# Patient Record
Sex: FEMALE | Race: WHITE | URBAN - METROPOLITAN AREA
[De-identification: names, ages, dates, MRNs, and addresses within clinical notes are randomized per-mention and may not be internally consistent; named-entity substitution may affect disease eponyms.]

---

## 2019-03-05 ENCOUNTER — IMPORTED ENCOUNTER (OUTPATIENT)
Dept: URBAN - METROPOLITAN AREA CLINIC 50 | Facility: CLINIC | Age: 74
End: 2019-03-05

## 2019-03-11 ENCOUNTER — IMPORTED ENCOUNTER (OUTPATIENT)
Dept: URBAN - METROPOLITAN AREA CLINIC 50 | Facility: CLINIC | Age: 74
End: 2019-03-11

## 2019-03-22 ENCOUNTER — IMPORTED ENCOUNTER (OUTPATIENT)
Dept: URBAN - METROPOLITAN AREA CLINIC 50 | Facility: CLINIC | Age: 74
End: 2019-03-22

## 2019-04-02 ENCOUNTER — IMPORTED ENCOUNTER (OUTPATIENT)
Dept: URBAN - METROPOLITAN AREA CLINIC 50 | Facility: CLINIC | Age: 74
End: 2019-04-02

## 2019-04-09 ENCOUNTER — IMPORTED ENCOUNTER (OUTPATIENT)
Dept: URBAN - METROPOLITAN AREA CLINIC 50 | Facility: CLINIC | Age: 74
End: 2019-04-09

## 2019-05-08 ENCOUNTER — IMPORTED ENCOUNTER (OUTPATIENT)
Dept: URBAN - METROPOLITAN AREA CLINIC 50 | Facility: CLINIC | Age: 74
End: 2019-05-08

## 2021-04-17 ASSESSMENT — VISUAL ACUITY
OD_CC: 20/30
OD_CC: 20/40-1/+1
OD_CC: 20/30
OS_CC: 20/30
OS_CC: 20/30-2
OS_CC: 20/30
OD_CC: J2@ 16 IN
OD_CC: 20/30
OS_OTHER: 20/200. 20/400.
OD_PH: 20/30
OD_OTHER: 20/80. 20/400.
OS_CC: 20/30
OS_CC: J2@ 16 IN
OS_BAT: 20/200
OD_CC: J1
OD_BAT: 20/80
OS_CC: J1

## 2021-04-17 ASSESSMENT — TONOMETRY
OS_IOP_MMHG: 17
OS_IOP_MMHG: 17
OS_IOP_MMHG: 15
OS_IOP_MMHG: 15
OS_IOP_MMHG: 19
OD_IOP_MMHG: 16
OD_IOP_MMHG: 15
OD_IOP_MMHG: 15
OS_IOP_MMHG: 15
OD_IOP_MMHG: 17
OD_IOP_MMHG: 17
OD_IOP_MMHG: 18

## 2021-04-17 ASSESSMENT — PACHYMETRY
OS_CT_UM: 511
OD_CT_UM: 526
OS_CT_UM: 511
OD_CT_UM: 526
OS_CT_UM: 511
OD_CT_UM: 526
OS_CT_UM: 511
OS_CT_UM: 511

## 2024-10-30 NOTE — PATIENT DISCUSSION
Glasses Rx given.
Observe.
Detail Level: Zone
General Sunscreen Counseling: I recommended a broad spectrum sunscreen with a SPF of 30 or higher. If swimming or exercising sunscreen should be reapplied every 45 minutes to an hour after getting wet or sweating.   Sun protective clothing can be used in lieu of sunscreen but must be worn the entire time you are exposed to the sun's rays.